# Patient Record
Sex: FEMALE | Race: WHITE | Employment: UNEMPLOYED | URBAN - METROPOLITAN AREA
[De-identification: names, ages, dates, MRNs, and addresses within clinical notes are randomized per-mention and may not be internally consistent; named-entity substitution may affect disease eponyms.]

---

## 2022-10-28 ENCOUNTER — HOSPITAL ENCOUNTER (OUTPATIENT)
Dept: PHYSICAL THERAPY | Age: 22
Discharge: HOME | End: 2022-10-28
Payer: MEDICAID

## 2022-10-28 PROCEDURE — 97161 PT EVAL LOW COMPLEX 20 MIN: CPT

## 2022-10-28 NOTE — PROGRESS NOTES
[facility]  [address], [address]  Ph [phone]  Fx [phone]    Plan of Care/ Statement of Necessity for Physical Therapy Services    Patient name: Richard Fischer Start of Care: 10/28/2022   Referral source: Adilia Veliz NP : 2000    Medical Diagnosis: Other symptoms and signs involving the genitourinary system [R39.89]   Onset Date:2021    Treatment Diagnosis: Other symptoms and signs involving the genitourinary system [R39.89]   Prior Hospitalization: see medical history Provider#: [id number]   Medications: Verified on Patient summary List    Comorbidities: none   Prior Level of Function: pain free intercourse          The Plan of Care and following information is based on the information from the initial evaluation. Assessment/ key information: Patient is a 23year old female presenting to Physical Therapy with c/o pelvic pain during intercourse. Patient presents with decreased strength, coordination, and endurance of the pelvic floor muscles and decreased strength of postural stabilizers. Patient also presents with first 2 layers of pelvic floor muscular restrictions. I feel patient would benefit from skilled therapeutic intervention to optimize highest functional level possible. Patient will benefit from skilled PT services to modify and progress therapeutic interventions, address functional mobility deficits, address ROM deficits, address strength deficits, analyze and address soft tissue restrictions, analyze and cue movement patterns, analyze and modify body mechanics/ergonomics, and assess and modify postural abnormalities to attain remaining goals.        Evaluation Complexity History LOW Complexity : Zero comorbidities / personal factors that will impact the outcome / POC; Examination LOW Complexity : 1-2 Standardized tests and measures addressing body structure, function, activity limitation and / or participation in recreation ;Presentation LOW Complexity : Stable, uncomplicated  ;Clinical Decision Making LOW Complexity : FOTO score of  FOTO score = an established functional score where 100 = no disability  Overall Complexity Rating: LOW     Problem List: Pelvic pain/dysfunction, Decreased pelvic floor mm awareness, Decreased pelvic floor mm strength, and Hypertonus of pelvic floor  Treatment Plan may include any combination of the following: Therapeutic exercise, Neuromuscular re-education, Manual therapy, and Patient education  Patient / Family readiness to learn indicated by: asking questions, trying to perform skills, and interest  Persons(s) to be included in education: patient (P)  Barriers to Learning/Limitations: None  Patient Goal (s): to not have pain with sex  Patient Self Reported Health Status: excellent  Rehabilitation Potential: excellent    Short term goals: To be achieved in 6 sessions:  Patient will be able to insert the 3rd largest dilator to help stretch tissues and prepare for pain free intercourse. Status at eval: pain with dilator 1     Patient will improve core strength by at least 1/2 grade to help decrease deann for pelvic floor to stabilize body and tighten to decrease pain with intercourse. Status at eval: core strength 2+/5 on right, 3-/5 on left      Patient will be independent and compliant with her home exercise program.   Status at eval: pt will be given HEP at next session     Long term goals: To be achieved in 12 sessions:   Patient will be able to insert the dilator equivalent to the size of her 's penis to help stretch tissues and prepare for pain free intercourse. Status at eval: pain with dilator 1     Patient will be able to decrease pelvic floor resting tone to a normal level in supine, sitting and standing.   Status at eval: manual assessment of pelvic floor revealed hyperacive, hypertonic pelvic floor with inability to relax after 1 contraction     Patient will improve core strength by at least 1 grade to help decrease deann for pelvic floor to stabilize body and tighten to decrease pain with intercourse. Status at eval: core strength 2+/5 on right, 3-/5 on left      Patient will demonstrate improvement of current complaints evidenced by a 10 point  improvement in FOTO, Pelvic functional disability index score  Status at Eval: Pelvic functional disability index 13.5     Patient will demonstrate independence with management tools and exercise program that are beneficial for current condition in order to feel comfortable with Pelvic floor PT D/C and not fear exacerbation of current condition or symptoms returning. Status at eval : patient fearful of return to exercise and unaware of what activities to avoid to avoid exacerbation of current condition    Frequency / Duration: Patient to be seen 1 times per week for 12 weeks. Patient/ Caregiver education and instruction: Diagnosis, prognosis, Pain Management and Exercises   [x]  Plan of care has been reviewed with PTA    Certification Period: 10/28/2022 - 1/20/2023    Kenan Middleton, MINDY 10/28/2022 12:31 PM    ________________________________________________________________________    I certify that the above Therapy Services are being furnished while the patient is under my care. I agree with the treatment plan and certify that this therapy is necessary.     Physician's Signature:____________________  Date:____________Time:____________                                      Michael Benson NP      Please sign and return to [facility] at THE [address]  [facility], [address]  Ph [phone]  Fx [phone]

## 2022-10-28 NOTE — PROGRESS NOTES
Physical Therapy Evaluation     Patient Name: Richard Fischer  [id number]  : 2000  [x]  Patient  Verified  Payor: Thelma Lester / [place] / Product Type: Managed Care Medicaid /    In time:10:15  Out time:11:00  Total Treatment Time (min): 45  Visit #: 1 of 12    Treatment Area: Other symptoms and signs involving the genitourinary system [R39.89]    SUBJECTIVE    Current symptoms/Complaints: Pt is a 23year old female who presents to physical therapy with complaints of pain with intercourse after the birth of her son on 10/06/2022. Her pain began when she was cleared to return to intercourse in 2021. She and her  have tried having sex in various positions and it still hurts, both with penetration and thrusting.      Pain Location: vagina, towards back   Pain Level (0-10 scale): 6-7  [x]constant []intermittent []improving []worsening []no change since onset (during intercourse)  Pain Quality: burning, pressure     Aggravating Factors: intercourse; pt was on birth control, quit taking it 1-2 months ago; hasn't had period yet so doesn't know tampons hurt  Alleviating Factors: nothing    PMHx/Surgical Hx: none  Birthing Hx: vaginal birth 4 hours - he got stuck but they didn't do a C section - episiotomy; gained 100 lbs during pregnancy no gestational diabetes or any comorbidities during pregnancy, gave birth to normal, healthy boy weighing 6 lb, 13 oz named [facility]  Any medication changes, allergies to medications, adverse drug reactions, diagnosis change, or new procedure performed?: [x] No    [] Yes (see summary sheet for update)    Occupation/Work Hx: no - no plans to return to work   Living Situation: lives at home with  and child  Exercise/Hobbies: walk around   Pt Goals: to be able to have sex again     Barriers: none  Motivation: excellent  Substance use: none   Cognition: A & O x 3      Urinary Symptoms: none  Bowel Symptoms: none    Physical Exam Objective Findings:    Gait:  [x] Normal     [] Abnormal:    Postural assessment: + levscioliosis mild    Pelvic girdle alignment:    Balance:    normal    Functional Strength:   Squat - good  Heel Raise - good but noted trunk extension indicative of core weakness    Active Movements: [] N/A   [] Too acute   [] Other:  Lumbar ROM % AROM Comments:pain, area   Forward flexion (norm 40-60 degrees) 100    Extension (norm 20-30 degrees) 100    SideBend right (norm 20-30 degrees) 50    SideBend left (norm 20-30 degrees) 50    Rotation right (norm 5-10 degrees) 100    Rotation left (norm 5-10 degrees) 100        Strength: Active SLR test: 2/5 right 2+/5 left     ( - )diastasis recti     Pelvic Floor Assessment  Patient was educated on pelvic floor anatomy, structure, and function and implications for current presentation of s/s. Patient consents to pelvic floor assessment.      Observation:  Contraction - minimal  Bulge - good  Knack - present    PFM Screen:    Skin Integrity:  [] Healthy [x] Red  [] Labia Atrophy [] Fragile    Sensation: [x] Intact [] Diminished:    Muscle Bulk: [x] Symmetrical  [] Well-developed [] Atrophied:  []L   []R   []B    Prolapse: none      External trigger point/ muscle tenderness:    Superficial PFM tenderness/restriction   Right/center Left   Bulbocavernosus (bulbospongiosus) + tenderness + tenderness   Ischiocavernosus - -   Superficial Transverse Perineal + tenderness + tenderness   Perineal body + tenderness    Levator Ani - -          Note: coccyx significantly sidebent to the right     Pelvic floor manual exam: Performed via internal vaginal assessment    Tender to palpation at superficial and deep transverse perineal bilaterally and bulbocavernosus bilaterally with 3/3 myofascial restrictions  Levator ani not tender or tight      Pelvic floor MMT    PERF (Performance/Endurance/Repetitions//Flicks): only performance assessed secondary to pain - strength at 3-/5 as right side wall didn't close in and pelvic floor never fully relaxed after strength tested      25 min [x]Eval                  []Re-Eval       15 min Self Care: Review and handout provided on the following: PFM anatomy, structure and function as it pertains to current s/s, complaints and condition. Reviewed expectations for PFPT and POC. Pt educated on use of dilators   Rationale:  Increase awareness and understanding of current condition to improve patients ability to independently and effectively attain goals and progress towards long term management of current condition. 5 min Neuromuscular Re-education: diaphragmatic breathing to relax pelvic floor []  See flow sheet :   Rationale: to help relax pelvic floor          With   [] TE   [x] TA   [x] neuro   [] other: Patient Education: [x] Review HEP    [] Progressed/Changed HEP based on:   [] positioning   [] body mechanics   [] transfers   [] heat/ice application    [] other:           Pain Level (0-10 scale) post treatment: 0    ASSESSMENT/Changes in Function: Patient is a 23year old female presenting to Physical Therapy with c/o pelvic pain during intercourse. Patient presents with decreased strength, coordination, and endurance of the pelvic floor muscles and decreased strength of postural stabilizers. Patient also presents with first 2 layers of pelvic floor muscular restrictions. I feel patient would benefit from skilled therapeutic intervention to optimize highest functional level possible. Patient will continue to benefit from skilled PT services to modify and progress therapeutic interventions, address functional mobility deficits, address ROM deficits, address strength deficits, analyze and address soft tissue restrictions, analyze and cue movement patterns, analyze and modify body mechanics/ergonomics, and assess and modify postural abnormalities to attain remaining goals.      [x]  See Plan of Care  []  See progress note/recertification  []  See Discharge Summary         Progress towards goals / Updated goals:  Short term goals: To be achieved in 6 sessions:  Patient will be able to insert the 3rd largest dilator to help stretch tissues and prepare for pain free intercourse. Status at eval: pain with dilator 1    Patient will improve core strength by at least 1/2 grade to help decrease deann for pelvic floor to stabilize body and tighten to decrease pain with intercourse. Status at eval: core strength 2+/5 on right, 3-/5 on left     Patient will be independent and compliant with her home exercise program.   Status at eval: pt will be given HEP at next session    Long term goals: To be achieved in 12 sessions:   Patient will be able to insert the dilator equivalent to the size of her 's penis to help stretch tissues and prepare for pain free intercourse. Status at eval: pain with dilator 1    Patient will be able to decrease pelvic floor resting tone to a normal level in supine, sitting and standing. Status at eval: manual assessment of pelvic floor revealed hyperacive, hypertonic pelvic floor with inability to relax after 1 contraction    Patient will improve core strength by at least 1 grade to help decrease deann for pelvic floor to stabilize body and tighten to decrease pain with intercourse. Status at eval: core strength 2+/5 on right, 3-/5 on left     Patient will demonstrate improvement of current complaints evidenced by a 10 point  improvement in FOTO, Pelvic functional disability index score  Status at Eval: Pelvic functional disability index 13.5    Patient will demonstrate independence with management tools and exercise program that are beneficial for current condition in order to feel comfortable with Pelvic floor PT D/C and not fear exacerbation of current condition or symptoms returning.    Status at eval : patient fearful of return to exercise and unaware of what activities to avoid to avoid exacerbation of current condition    PLAN  [] Upgrade activities as tolerated     [x]  Continue plan of care  []  Update interventions per flow sheet       []  Discharge due to:_  []  Other:_      Gary Quevedo, PT 10/28/2022  8:30 AM

## 2022-11-11 ENCOUNTER — APPOINTMENT (OUTPATIENT)
Dept: PHYSICAL THERAPY | Age: 22
End: 2022-11-11

## 2022-11-18 ENCOUNTER — TELEPHONE (OUTPATIENT)
Dept: PHYSICAL THERAPY | Age: 22
End: 2022-11-18

## 2022-12-09 ENCOUNTER — TELEPHONE (OUTPATIENT)
Dept: PHYSICAL THERAPY | Age: 22
End: 2022-12-09

## 2022-12-16 ENCOUNTER — APPOINTMENT (OUTPATIENT)
Dept: PHYSICAL THERAPY | Age: 22
End: 2022-12-16

## 2022-12-20 NOTE — PROGRESS NOTES
[facility]  [address], [address], [address] [facility]  Phone [phone]  Fax [phone]    Physical Therapy Discharge Summary    Patient name: Christine Gordillo Start of Care: 10/28/2022   Referral source: Constance Bright NP : 2000               Medical Diagnosis: Other symptoms and signs involving the genitourinary system [R39.89]    Onset Date:2021               Treatment Diagnosis: Other symptoms and signs involving the genitourinary system [R39.89]   Prior Hospitalization: see medical history Provider#: [id number]   Medications: Verified on Patient summary List    Comorbidities: none   Prior Level of Function: pain free intercourse    Visits from Start of Care: 1    Missed Visits: 2    Reporting Period : 10/28/22 to 10/28/22    Assessment / Summary of Care:  Unable to formally assess goals as patient has failed to show for scheduled followup appointments. Please see below for the most recent goals assessment while patient was under the care of this PT clinic. D/C from skilled PT at this time. A new order will be required should further physical therapy services be necessary. Thank you for the referral to [facility]. Short term goals: To be achieved in 6 sessions:  Patient will be able to insert the 3rd largest dilator to help stretch tissues and prepare for pain free intercourse. Status at eval: pain with dilator 1     Patient will improve core strength by at least 1/2 grade to help decrease deann for pelvic floor to stabilize body and tighten to decrease pain with intercourse. Status at eval: core strength 2+/5 on right, 3-/5 on left      Patient will be independent and compliant with her home exercise program.   Status at eval: pt will be given HEP at next session     Long term goals:  To be achieved in 12 sessions:   Patient will be able to insert the dilator equivalent to the size of her 's penis to help stretch tissues and prepare for pain free intercourse. Status at eval: pain with dilator 1     Patient will be able to decrease pelvic floor resting tone to a normal level in supine, sitting and standing. Status at eval: manual assessment of pelvic floor revealed hyperacive, hypertonic pelvic floor with inability to relax after 1 contraction     Patient will improve core strength by at least 1 grade to help decrease deann for pelvic floor to stabilize body and tighten to decrease pain with intercourse. Status at eval: core strength 2+/5 on right, 3-/5 on left      Patient will demonstrate improvement of current complaints evidenced by a 10 point  improvement in FOTO, Pelvic functional disability index score  Status at Eval: Pelvic functional disability index 13.5     Patient will demonstrate independence with management tools and exercise program that are beneficial for current condition in order to feel comfortable with Pelvic floor PT D/C and not fear exacerbation of current condition or symptoms returning. Status at eval : patient fearful of return to exercise and unaware of what activities to avoid to avoid exacerbation of current condition      RECOMMENDATIONS:  [x]Discontinue therapy: []Patient has reached or is progressing toward set goals      [x]Patient is non-compliant or has abdicated      []Due to lack of appreciable progress towards set goals    Thank you for the referral to [facility]! Margarette Ochoa, PT  12/20/2022   6:23 PM    ------------------------------------------------------------------------------------------------------------------------  NOTE TO PHYSICIAN:  Please complete the following and fax to: [facility] at [facility] at [phone]  If you are unable to process this request in   24 hours, please contact our office.      [] I have read the above report and request that my patient continue therapy with the following changes/special instructions:  [] I have read the above report and request that my patient be discharged from therapy.     [de-identified] Signature:____________________ Date:_________ TIME:________                                        Lolis Frank NP      ** Signature, Date and Time must be completed for valid certification **

## 2022-12-23 ENCOUNTER — APPOINTMENT (OUTPATIENT)
Dept: PHYSICAL THERAPY | Age: 22
End: 2022-12-23

## 2022-12-30 ENCOUNTER — APPOINTMENT (OUTPATIENT)
Dept: PHYSICAL THERAPY | Age: 22
End: 2022-12-30